# Patient Record
Sex: MALE | Race: WHITE | NOT HISPANIC OR LATINO | Employment: FULL TIME | URBAN - METROPOLITAN AREA
[De-identification: names, ages, dates, MRNs, and addresses within clinical notes are randomized per-mention and may not be internally consistent; named-entity substitution may affect disease eponyms.]

---

## 2020-01-21 ENCOUNTER — HOSPITAL ENCOUNTER (EMERGENCY)
Facility: HOSPITAL | Age: 35
Discharge: HOME | End: 2020-01-22
Attending: EMERGENCY MEDICINE

## 2020-01-21 VITALS
SYSTOLIC BLOOD PRESSURE: 153 MMHG | WEIGHT: 211.06 LBS | RESPIRATION RATE: 18 BRPM | OXYGEN SATURATION: 99 % | HEART RATE: 100 BPM | TEMPERATURE: 98 F | HEIGHT: 70 IN | BODY MASS INDEX: 30.22 KG/M2 | DIASTOLIC BLOOD PRESSURE: 101 MMHG

## 2020-01-21 DIAGNOSIS — M71.022 ABSCESS OF BURSA, LEFT ELBOW: Primary | ICD-10-CM

## 2020-01-21 DIAGNOSIS — M25.522 LEFT ELBOW PAIN: ICD-10-CM

## 2020-01-21 DIAGNOSIS — R03.0 ELEVATED BLOOD PRESSURE READING IN OFFICE WITHOUT DIAGNOSIS OF HYPERTENSION: ICD-10-CM

## 2020-01-21 LAB
BASOPHILS # BLD AUTO: 0.03 K/UL (ref 0–0.2)
BASOPHILS NFR BLD: 0.3 % (ref 0–1.9)
BNP SERPL-MCNC: <10 PG/ML (ref 0–99)
CRP SERPL-MCNC: 47.9 MG/L (ref 0–8.2)
DIFFERENTIAL METHOD: ABNORMAL
EOSINOPHIL # BLD AUTO: 0.3 K/UL (ref 0–0.5)
EOSINOPHIL NFR BLD: 3 % (ref 0–8)
ERYTHROCYTE [DISTWIDTH] IN BLOOD BY AUTOMATED COUNT: 13.1 % (ref 11.5–14.5)
ERYTHROCYTE [SEDIMENTATION RATE] IN BLOOD BY WESTERGREN METHOD: 6 MM/HR (ref 0–10)
HCT VFR BLD AUTO: 50.7 % (ref 40–54)
HGB BLD-MCNC: 17.3 G/DL (ref 14–18)
IMM GRANULOCYTES # BLD AUTO: 0.19 K/UL (ref 0–0.04)
IMM GRANULOCYTES NFR BLD AUTO: 2 % (ref 0–0.5)
LYMPHOCYTES # BLD AUTO: 0.8 K/UL (ref 1–4.8)
LYMPHOCYTES NFR BLD: 7.9 % (ref 18–48)
MCH RBC QN AUTO: 29 PG (ref 27–31)
MCHC RBC AUTO-ENTMCNC: 34.1 G/DL (ref 32–36)
MCV RBC AUTO: 85 FL (ref 82–98)
MONOCYTES # BLD AUTO: 0.9 K/UL (ref 0.3–1)
MONOCYTES NFR BLD: 8.9 % (ref 4–15)
NEUTROPHILS # BLD AUTO: 7.6 K/UL (ref 1.8–7.7)
NEUTROPHILS NFR BLD: 77.9 % (ref 38–73)
NRBC BLD-RTO: 0 /100 WBC
PLATELET # BLD AUTO: 200 K/UL (ref 150–350)
PMV BLD AUTO: 11.1 FL (ref 9.2–12.9)
RBC # BLD AUTO: 5.97 M/UL (ref 4.6–6.2)
WBC # BLD AUTO: 9.74 K/UL (ref 3.9–12.7)

## 2020-01-21 PROCEDURE — 10060 I&D ABSCESS SIMPLE/SINGLE: CPT

## 2020-01-21 PROCEDURE — 83880 ASSAY OF NATRIURETIC PEPTIDE: CPT

## 2020-01-21 PROCEDURE — 86140 C-REACTIVE PROTEIN: CPT

## 2020-01-21 PROCEDURE — 85025 COMPLETE CBC W/AUTO DIFF WBC: CPT

## 2020-01-21 PROCEDURE — 85651 RBC SED RATE NONAUTOMATED: CPT

## 2020-01-21 PROCEDURE — 99284 EMERGENCY DEPT VISIT MOD MDM: CPT | Mod: 25

## 2020-01-21 RX ORDER — LIDOCAINE HYDROCHLORIDE 10 MG/ML
5 INJECTION, SOLUTION EPIDURAL; INFILTRATION; INTRACAUDAL; PERINEURAL
Status: COMPLETED | OUTPATIENT
Start: 2020-01-21 | End: 2020-01-22

## 2020-01-22 PROCEDURE — 87070 CULTURE OTHR SPECIMN AEROBIC: CPT

## 2020-01-22 PROCEDURE — 87077 CULTURE AEROBIC IDENTIFY: CPT

## 2020-01-22 PROCEDURE — 25000003 PHARM REV CODE 250: Performed by: NURSE PRACTITIONER

## 2020-01-22 PROCEDURE — 87186 SC STD MICRODIL/AGAR DIL: CPT

## 2020-01-22 PROCEDURE — 63600175 PHARM REV CODE 636 W HCPCS: Performed by: NURSE PRACTITIONER

## 2020-01-22 RX ORDER — ONDANSETRON 4 MG/1
4 TABLET, ORALLY DISINTEGRATING ORAL EVERY 6 HOURS PRN
Qty: 15 TABLET | Refills: 0 | Status: SHIPPED | OUTPATIENT
Start: 2020-01-22

## 2020-01-22 RX ORDER — ONDANSETRON 2 MG/ML
4 INJECTION INTRAMUSCULAR; INTRAVENOUS
Status: COMPLETED | OUTPATIENT
Start: 2020-01-22 | End: 2020-01-22

## 2020-01-22 RX ORDER — CEPHALEXIN 250 MG/1
250 CAPSULE ORAL 4 TIMES DAILY
Qty: 28 CAPSULE | Refills: 0 | Status: SHIPPED | OUTPATIENT
Start: 2020-01-22 | End: 2020-01-29

## 2020-01-22 RX ORDER — HYDROCODONE BITARTRATE AND ACETAMINOPHEN 7.5; 325 MG/1; MG/1
1 TABLET ORAL EVERY 6 HOURS PRN
Qty: 11 TABLET | Refills: 0 | Status: SHIPPED | OUTPATIENT
Start: 2020-01-22 | End: 2020-02-01

## 2020-01-22 RX ORDER — DOXYCYCLINE 100 MG/1
100 CAPSULE ORAL 2 TIMES DAILY
Qty: 20 CAPSULE | Refills: 0 | Status: SHIPPED | OUTPATIENT
Start: 2020-01-22 | End: 2020-02-01

## 2020-01-22 RX ORDER — MORPHINE SULFATE 4 MG/ML
4 INJECTION, SOLUTION INTRAMUSCULAR; INTRAVENOUS
Status: COMPLETED | OUTPATIENT
Start: 2020-01-22 | End: 2020-01-22

## 2020-01-22 RX ADMIN — MORPHINE SULFATE 4 MG: 4 INJECTION INTRAVENOUS at 12:01

## 2020-01-22 RX ADMIN — ONDANSETRON 4 MG: 2 INJECTION INTRAMUSCULAR; INTRAVENOUS at 12:01

## 2020-01-22 RX ADMIN — LIDOCAINE HYDROCHLORIDE 50 MG: 10 INJECTION, SOLUTION EPIDURAL; INFILTRATION; INTRACAUDAL at 12:01

## 2020-01-22 NOTE — ED PROVIDER NOTES
SCRIBE #1 NOTE: I, Carloz Odell, am scribing for, and in the presence of, Danica Kat NP. I have scribed the HPI, ROS, and PEx.       History     Chief Complaint   Patient presents with    Abscess     abscess noted to left elbow x 4 days, +purulent drainage; sent by  for eval     Review of patient's allergies indicates:  No Known Allergies      History of Present Illness     HPI    1/21/2020, 10:13 PM  History obtained from the patient      History of Present Illness: Frederick Johnson is a 34 y.o. male patient who presents to the Emergency Department for evaluation of a painful abscess to his left elbow which onset gradually 4 days PTA. Pt was sent over from urgent care. Pt denies injury or IV drug use. Symptoms are constant and moderate in severity. No mitigating or exacerbating factors reported. No associated sxs reported. Patient denies any fever, chills, HA, dizziness, N/V, and all other sxs at this time. No prior Tx reported. No further complaints or concerns at this time.       Arrival mode: Personal vehicle    PCP: Primary Doctor No     Past Medical History:  History reviewed. No pertinent medical history.    Past Surgical History:  History reviewed. No pertinent surgical history.    Family History:  History reviewed. No pertinent family history.    Social History:  Social History Main Topics    Smoking status: Unknown if ever smoked    Smokeless tobacco: Unknown if ever used    Alcohol Use: Unknown drinking history    Drug Use: Unknown if ever used    Sexual Activity: Unknown        Review of Systems     Review of Systems   Constitutional: Negative for chills and fever.   HENT: Negative for sore throat.    Respiratory: Negative for cough and shortness of breath.    Cardiovascular: Negative for chest pain and leg swelling.   Gastrointestinal: Negative for abdominal pain, diarrhea, nausea and vomiting.   Genitourinary: Negative for dysuria.   Musculoskeletal: Negative for back pain, neck pain  and neck stiffness.   Skin: Positive for wound (Abscess with purulent drainage to left elbow; painful). Negative for rash.   Neurological: Negative for dizziness, weakness, light-headedness, numbness and headaches.   Hematological: Does not bruise/bleed easily.   All other systems reviewed and are negative.     Physical Exam     Initial Vitals [01/21/20 2145]   BP Pulse Resp Temp SpO2   (!) 176/101 102 18 98.4 °F (36.9 °C) 99 %      MAP       --          Physical Exam  Nursing Notes and Vital Signs Reviewed.  Constitutional: Patient is in no acute distress. Well-developed and well-nourished.  Head: Atraumatic. Normocephalic.  Eyes: PERRL. EOM intact. Conjunctivae are not pale. No scleral icterus.  ENT: Mucous membranes are moist. Oropharynx is clear and symmetric.    Neck: Supple. Full ROM. No lymphadenopathy.  Cardiovascular: Regular rate. Regular rhythm. No murmurs, rubs, or gallops. Distal pulses are 2+ and symmetric.  Pulmonary/Chest: No respiratory distress. Clear to auscultation bilaterally. No wheezing or rales.  Abdominal: Soft and non-distended.  There is no tenderness.  No rebound, guarding, or rigidity. Good bowel sounds.  Genitourinary: No CVA tenderness  Musculoskeletal: Moves all extremities. No obvious deformities. No edema. No calf tenderness.  RUE: There is a 2 x 2 cm area of erythema and swelling noted; mildly indurated, no fluctuance. Cap refill distally is <2 seconds. Radial pulses are equal and 2+ bilaterally. No motor deficit. No distal sensory deficit. NVI distally.   Skin: Warm and dry.  Neurological:  Alert, awake, and appropriate.  Normal speech.  No acute focal neurological deficits are appreciated.  Psychiatric: Normal affect. Good eye contact. Appropriate in content.           ED Course   I & D - Incision and Drainage  Date/Time: 1/22/2020 12:30 AM  Performed by: Danica Kat NP  Authorized by: Deysi Garcia MD   Type: abscess  Body area: upper extremity  Location details: left  "elbow  Anesthesia: local infiltration    Anesthesia:  Local anesthesia used: yes  Local Anesthetic: lidocaine 1% without epinephrine  Anesthetic total: 5 mL  Scalpel size: 11  Incision type: single straight  Complexity: simple  Drainage: pus and  bloody  Drainage amount: scant  Wound treatment: incision  Packing material: 1/4 in gauze  Complications: No  Estimated blood loss (mL): 5  Specimens: Yes  Implants: No  Patient tolerance: Patient tolerated the procedure well with no immediate complications        ED Vital Signs:  Vitals:    01/21/20 2145 01/21/20 2248   BP: (!) 176/101 (!) 153/101   Pulse: 102 100   Resp: 18 18   Temp: 98.4 °F (36.9 °C)    TempSrc: Oral    SpO2: 99% 99%   Weight: 95.8 kg (211 lb 1.5 oz)    Height: 5' 10" (1.778 m)        Abnormal Lab Results:  Labs Reviewed   CBC W/ AUTO DIFFERENTIAL - Abnormal; Notable for the following components:       Result Value    Immature Granulocytes 2.0 (*)     Immature Grans (Abs) 0.19 (*)     Lymph # 0.8 (*)     Gran% 77.9 (*)     Lymph% 7.9 (*)     All other components within normal limits   C-REACTIVE PROTEIN - Abnormal; Notable for the following components:    CRP 47.9 (*)     All other components within normal limits   CULTURE, AEROBIC  (SPECIFY SOURCE)   B-TYPE NATRIURETIC PEPTIDE   SEDIMENTATION RATE        All Lab Results:  Results for orders placed or performed during the hospital encounter of 01/21/20   CBC auto differential   Result Value Ref Range    WBC 9.74 3.90 - 12.70 K/uL    RBC 5.97 4.60 - 6.20 M/uL    Hemoglobin 17.3 14.0 - 18.0 g/dL    Hematocrit 50.7 40.0 - 54.0 %    Mean Corpuscular Volume 85 82 - 98 fL    Mean Corpuscular Hemoglobin 29.0 27.0 - 31.0 pg    Mean Corpuscular Hemoglobin Conc 34.1 32.0 - 36.0 g/dL    RDW 13.1 11.5 - 14.5 %    Platelets 200 150 - 350 K/uL    MPV 11.1 9.2 - 12.9 fL    Immature Granulocytes 2.0 (H) 0.0 - 0.5 %    Gran # (ANC) 7.6 1.8 - 7.7 K/uL    Immature Grans (Abs) 0.19 (H) 0.00 - 0.04 K/uL    Lymph # 0.8 (L) " 1.0 - 4.8 K/uL    Mono # 0.9 0.3 - 1.0 K/uL    Eos # 0.3 0.0 - 0.5 K/uL    Baso # 0.03 0.00 - 0.20 K/uL    nRBC 0 0 /100 WBC    Gran% 77.9 (H) 38.0 - 73.0 %    Lymph% 7.9 (L) 18.0 - 48.0 %    Mono% 8.9 4.0 - 15.0 %    Eosinophil% 3.0 0.0 - 8.0 %    Basophil% 0.3 0.0 - 1.9 %    Differential Method Automated    Brain natriuretic peptide   Result Value Ref Range    BNP <10 0 - 99 pg/mL   C-reactive protein   Result Value Ref Range    CRP 47.9 (H) 0.0 - 8.2 mg/L   Sedimentation rate   Result Value Ref Range    Sed Rate 6 0 - 10 mm/Hr       Results for orders placed or performed during the hospital encounter of 01/21/20   CBC auto differential   Result Value Ref Range    WBC 9.74 3.90 - 12.70 K/uL    RBC 5.97 4.60 - 6.20 M/uL    Hemoglobin 17.3 14.0 - 18.0 g/dL    Hematocrit 50.7 40.0 - 54.0 %    Mean Corpuscular Volume 85 82 - 98 fL    Mean Corpuscular Hemoglobin 29.0 27.0 - 31.0 pg    Mean Corpuscular Hemoglobin Conc 34.1 32.0 - 36.0 g/dL    RDW 13.1 11.5 - 14.5 %    Platelets 200 150 - 350 K/uL    MPV 11.1 9.2 - 12.9 fL    Immature Granulocytes 2.0 (H) 0.0 - 0.5 %    Gran # (ANC) 7.6 1.8 - 7.7 K/uL    Immature Grans (Abs) 0.19 (H) 0.00 - 0.04 K/uL    Lymph # 0.8 (L) 1.0 - 4.8 K/uL    Mono # 0.9 0.3 - 1.0 K/uL    Eos # 0.3 0.0 - 0.5 K/uL    Baso # 0.03 0.00 - 0.20 K/uL    nRBC 0 0 /100 WBC    Gran% 77.9 (H) 38.0 - 73.0 %    Lymph% 7.9 (L) 18.0 - 48.0 %    Mono% 8.9 4.0 - 15.0 %    Eosinophil% 3.0 0.0 - 8.0 %    Basophil% 0.3 0.0 - 1.9 %    Differential Method Automated    Brain natriuretic peptide   Result Value Ref Range    BNP <10 0 - 99 pg/mL   C-reactive protein   Result Value Ref Range    CRP 47.9 (H) 0.0 - 8.2 mg/L   Sedimentation rate   Result Value Ref Range    Sed Rate 6 0 - 10 mm/Hr       Imaging Results:  Imaging Results          X-Ray Elbow Complete Left (Final result)  Result time 01/21/20 22:41:56    Final result by Drew Zavaleta III, MD (01/21/20 22:41:56)                 Impression:      Soft tissue  swelling.  No acute osseous abnormality identified.      Electronically signed by: Drew Zavaleta MD  Date:    01/21/2020  Time:    22:41             Narrative:    EXAMINATION:  XR ELBOW COMPLETE 3 VIEW LEFT    CLINICAL HISTORY:  Pain in left elbow    FINDINGS:  There is diffuse soft tissue swelling, most prominent in the posterior elbow.  No foreign body identified.  No evidence of osteomyelitis.  No fracture identified.  Joint alignment is anatomic.  No joint effusion identified.  No arthritic change identified.                                      The Emergency Provider reviewed the vital signs and test results, which are outlined above.     ED Discussion     11:30PM: Dr Conti with orthopedics consult for recommendation on further treatment in ER.  He recommended I&D, culture, discharge home on antibiotics with follow-up in clinic on Friday.    12:50 AM: Reassessed pt at this time.  Pt is awake, alert, and in NAD at this time. Discussed with pt all pertinent ED information and results. Discussed pt dx and plan of tx.  Informed patient state medications as prescribed until completion, discussed with patient to follow up with Dr. Conti on Friday for further evaluation of left elbow abscess.  Counseled patient at length on signs and symptoms of when to return to emergency room, and patient verbalized agreement and understanding.  Gave pt all f/u and return to the ED instructions. All questions and concerns were addressed at this time. Pt expresses understanding of information and instructions, and is comfortable with plan to discharge. Pt is stable for discharge.    I discussed wound care precautions with patient and/or family/caretaker; specifically that all wounds have risk of infection despite efforts to cleanse and debride the wound; and there is a risk of an occult foreign body (and thus increased risk of infection) despite a negative examination.  I discussed with patient need to return for any signs of  infection, specifically redness, increased pain, fever, drainage of pus, or any concern, immediately.    I discussed with patient and/or family/caretaker that evaluation in the ED does not suggest any emergent or life threatening medical conditions requiring immediate intervention beyond what was provided in the ED, and I believe patient is safe for discharge.  Regardless, an unremarkable evaluation in the ED does not preclude the development or presence of a serious of life threatening condition. As such, patient was instructed to return immediately for any worsening or change in current symptoms.         Medical Decision Making:   Clinical Tests:   Lab Tests: Ordered and Reviewed  Radiological Study: Ordered and Reviewed           ED Medication(s):  Medications   lidocaine (PF) 10 mg/ml (1%) injection 50 mg (50 mg Infiltration Given 1/22/20 0035)   morphine injection 4 mg (4 mg Intravenous Given 1/22/20 0035)   ondansetron injection 4 mg (4 mg Intravenous Given 1/22/20 0035)       New Prescriptions    CEPHALEXIN (KEFLEX) 250 MG CAPSULE    Take 1 capsule (250 mg total) by mouth 4 (four) times daily. for 7 days    DOXYCYCLINE (VIBRAMYCIN) 100 MG CAP    Take 1 capsule (100 mg total) by mouth 2 (two) times daily. for 10 days    HYDROCODONE-ACETAMINOPHEN (NORCO) 7.5-325 MG PER TABLET    Take 1 tablet by mouth every 6 (six) hours as needed for Pain.    ONDANSETRON (ZOFRAN-ODT) 4 MG TBDL    Take 1 tablet (4 mg total) by mouth every 6 (six) hours as needed.       Follow-up Information     Aleks Conti, DO In 3 days.    Specialty:  Orthopedic Surgery  Why:  Follow up with Dr. Conti for further evaluation of left elbow abscess. Take antibiotics as prescribed until completion., Return to ED for any concerns.  Contact information:  [address] [place] [address] DR Rosalinda CHAN [id number] [phone]                       Scribe Attestation:   Scribe #1: I performed the above scribed service and the documentation accurately  describes the services I performed. I attest to the accuracy of the note.     Attending:   Physician Attestation Statement for Scribe #1: I, Danica Kat NP, personally performed the services described in this documentation, as scribed by Carloz Bain, in my presence, and it is both accurate and complete.           Clinical Impression       ICD-10-CM ICD-9-CM   1. Abscess of bursa, left elbow M71.022 727.89   2. Left elbow pain M25.522 719.42   3. Elevated blood pressure reading in office without diagnosis of hypertension R03.0 796.2       Disposition:   Disposition: Discharged  Condition: Stable        Danica Kat NP  01/22/20 0052

## 2020-01-22 NOTE — DISCHARGE INSTRUCTIONS
Call Dr. Conti office in am to osvaldo an appointment for reevaluation of left elbow abscess. Keep area clean and dry, wash with antibacterial soap and water at least twice daily and cover with clean bandage. If medications were ordered Take all until completion as prescribed. Return for any signs of infection which include fever over 100.4, increase pain, increase redness, swelling, drainage, inability to move joint, vomiting, dizziness, weakness, or any concerns.

## 2020-01-24 LAB — BACTERIA SPEC AEROBE CULT: ABNORMAL

## 2023-09-05 ENCOUNTER — HOSPITAL ENCOUNTER (EMERGENCY)
Age: 38
Discharge: HOME | End: 2023-09-05
Attending: EMERGENCY MEDICINE

## 2023-09-05 VITALS
OXYGEN SATURATION: 99 % | RESPIRATION RATE: 16 BRPM | HEART RATE: 74 BPM | TEMPERATURE: 98.1 F | SYSTOLIC BLOOD PRESSURE: 158 MMHG | DIASTOLIC BLOOD PRESSURE: 109 MMHG

## 2023-09-05 DIAGNOSIS — I10 PRIMARY HYPERTENSION: Primary | ICD-10-CM

## 2023-09-05 LAB
ANION GAP SERPL CALCULATED.3IONS-SCNC: 6 MMOL/L (ref 4–16)
BUN SERPL-MCNC: 12 MG/DL (ref 6–23)
CALCIUM SERPL-MCNC: 10 MG/DL (ref 8.3–10.6)
CHLORIDE BLD-SCNC: 101 MMOL/L (ref 99–110)
CO2: 27 MMOL/L (ref 21–32)
CREAT SERPL-MCNC: 1.2 MG/DL (ref 0.9–1.3)
GFR SERPL CREATININE-BSD FRML MDRD: >60 ML/MIN/1.73M2
GLUCOSE SERPL-MCNC: 73 MG/DL (ref 70–99)
POTASSIUM SERPL-SCNC: 5.2 MMOL/L (ref 3.5–5.1)
SODIUM BLD-SCNC: 134 MMOL/L (ref 135–145)

## 2023-09-05 PROCEDURE — 93005 ELECTROCARDIOGRAM TRACING: CPT | Performed by: EMERGENCY MEDICINE

## 2023-09-05 PROCEDURE — 80048 BASIC METABOLIC PNL TOTAL CA: CPT

## 2023-09-05 PROCEDURE — 99284 EMERGENCY DEPT VISIT MOD MDM: CPT

## 2023-09-05 RX ORDER — LISINOPRIL 5 MG/1
5 TABLET ORAL DAILY
Qty: 30 TABLET | Refills: 0 | Status: SHIPPED | OUTPATIENT
Start: 2023-09-05

## 2023-09-05 ASSESSMENT — LIFESTYLE VARIABLES
HOW MANY STANDARD DRINKS CONTAINING ALCOHOL DO YOU HAVE ON A TYPICAL DAY: PATIENT DOES NOT DRINK
HOW OFTEN DO YOU HAVE A DRINK CONTAINING ALCOHOL: NEVER

## 2023-09-05 NOTE — ED TRIAGE NOTES
Pt arrived to the ED with complaints of high blood pressure. Pt states he has high bp, but does not see a PCP for this issue.

## 2023-09-05 NOTE — ED PROVIDER NOTES
Emergency Department Encounter    Patient: Christine Shelby  MRN: [id number]  : 1985  Date of Evaluation: 2023  ED Provider:  Elfego Neff MD    Triage Chief Complaint:   Hypertension (Checked bp this morning and reading was 185/118)    [place]:  Christine Shelby is a 41 y.o. male that presents with concern for elevated blood pressure. He had some dizziness off and on and blood pressure has been high. He notes that his blood pressures been high at any time and had been routinely checked in the last couple of years but he had not seen anybody about this. He was on vacation in [place] with significant other who is at bedside. They note that he had felt dizzy and the resort had a nurse that came and checked him and his blood pressure was really high and they told him that he was dehydrated and gave him a bag of fluids and magnesium. He has not had any nausea or vomiting. No vision changes or headache. This morning he felt dizzy when he got out of the shower, it resolved. No chest pain or shortness of breath. No previous known kidney problems. He has never had his cholesterol checked or been checked for diabetes. He does note a history of hypertension in his family members. He drank a little bit of alcohol on Friday while on vacation, but does not drink regularly. He denies other drug use. Notes that he was started on TRT, testosterone replacement, about a year ago, by a doctor in [place]. Last injection was last Tuesday- he gives them to himself. They go back and forth from [place] to here, moved back and forth over the last 5-6 years, more permanent here for about 9 months out of the year now, not established with any doctor around here. ROS - see HPI, below listed is current ROS at time of my eval:  10 systems reviewed and negative except as above. History reviewed. No pertinent past medical history. History reviewed. No pertinent surgical history. History reviewed.  No

## 2023-09-06 LAB
EKG ATRIAL RATE: 80 BPM
EKG DIAGNOSIS: NORMAL
EKG P AXIS: 46 DEGREES
EKG P-R INTERVAL: 150 MS
EKG Q-T INTERVAL: 330 MS
EKG QRS DURATION: 78 MS
EKG QTC CALCULATION (BAZETT): 380 MS
EKG R AXIS: -26 DEGREES
EKG T AXIS: -30 DEGREES
EKG VENTRICULAR RATE: 80 BPM

## 2023-09-06 PROCEDURE — 93010 ELECTROCARDIOGRAM REPORT: CPT | Performed by: INTERNAL MEDICINE
